# Patient Record
Sex: FEMALE | Race: BLACK OR AFRICAN AMERICAN | Employment: UNEMPLOYED | URBAN - METROPOLITAN AREA
[De-identification: names, ages, dates, MRNs, and addresses within clinical notes are randomized per-mention and may not be internally consistent; named-entity substitution may affect disease eponyms.]

---

## 2023-08-25 ENCOUNTER — TELEPHONE (OUTPATIENT)
Dept: OBGYN | Facility: CLINIC | Age: 28
End: 2023-08-25

## 2023-08-25 ENCOUNTER — TELEPHONE (OUTPATIENT)
Dept: FAMILY MEDICINE | Facility: CLINIC | Age: 28
End: 2023-08-25

## 2023-08-25 NOTE — TELEPHONE ENCOUNTER
Reason for Call:  Appointment Request    Patient requesting this type of appt:  Pregnancy     Requested provider: No Ref-Primary, Physician    Reason patient unable to be scheduled: Needs to be scheduled by clinic    When does patient want to be seen/preferred time:  as soon as possible     Comments: Transfering OB/ 28 weeks and looking to schedule with any provider for OB care. Please call to schedule.     Okay to leave a detailed message?: Yes at Home number on file [phone] (home)    Call taken on 8/25/2023 at 11:50 AM by Sheron Godinez

## 2023-08-25 NOTE — TELEPHONE ENCOUNTER
How many weeks today or YOLANDE:  28 wks     Transferring from: [facility]    Transferring to: (specific doctor/group/midwives) Doctor    Reason for Transfer: No      Any Previous C-sections: Yes    Fax number given for records: [phone]